# Patient Record
Sex: MALE | Race: OTHER | HISPANIC OR LATINO
[De-identification: names, ages, dates, MRNs, and addresses within clinical notes are randomized per-mention and may not be internally consistent; named-entity substitution may affect disease eponyms.]

---

## 2018-03-20 PROBLEM — Z00.00 ENCOUNTER FOR PREVENTIVE HEALTH EXAMINATION: Status: ACTIVE | Noted: 2018-03-20

## 2018-04-19 ENCOUNTER — APPOINTMENT (OUTPATIENT)
Dept: UROLOGY | Facility: CLINIC | Age: 43
End: 2018-04-19
Payer: MEDICAID

## 2018-04-19 ENCOUNTER — OUTPATIENT (OUTPATIENT)
Dept: OUTPATIENT SERVICES | Facility: HOSPITAL | Age: 43
LOS: 1 days | Discharge: HOME | End: 2018-04-19

## 2018-04-19 VITALS
RESPIRATION RATE: 16 BRPM | WEIGHT: 137 LBS | BODY MASS INDEX: 23.39 KG/M2 | HEART RATE: 74 BPM | DIASTOLIC BLOOD PRESSURE: 74 MMHG | SYSTOLIC BLOOD PRESSURE: 120 MMHG | HEIGHT: 64 IN

## 2018-04-19 DIAGNOSIS — Z78.9 OTHER SPECIFIED HEALTH STATUS: ICD-10-CM

## 2018-04-19 PROCEDURE — 99203 OFFICE O/P NEW LOW 30 MIN: CPT

## 2018-04-19 RX ORDER — SILDENAFIL 20 MG/1
20 TABLET ORAL
Qty: 20 | Refills: 5 | Status: ACTIVE | COMMUNITY
Start: 2018-04-19 | End: 1900-01-01

## 2018-05-16 ENCOUNTER — APPOINTMENT (OUTPATIENT)
Dept: UROLOGY | Facility: CLINIC | Age: 43
End: 2018-05-16
Payer: MEDICAID

## 2018-05-16 PROCEDURE — 93980 PENILE VASCULAR STUDY: CPT

## 2018-05-16 PROCEDURE — 54235 NJX CORPORA CAVERNOSA RX AGT: CPT

## 2018-06-21 ENCOUNTER — OUTPATIENT (OUTPATIENT)
Dept: OUTPATIENT SERVICES | Facility: HOSPITAL | Age: 43
LOS: 1 days | Discharge: HOME | End: 2018-06-21

## 2018-06-21 ENCOUNTER — APPOINTMENT (OUTPATIENT)
Dept: UROLOGY | Facility: CLINIC | Age: 43
End: 2018-06-21
Payer: MEDICAID

## 2018-06-21 VITALS
WEIGHT: 137 LBS | RESPIRATION RATE: 16 BRPM | HEART RATE: 59 BPM | DIASTOLIC BLOOD PRESSURE: 76 MMHG | HEIGHT: 64 IN | BODY MASS INDEX: 23.39 KG/M2 | SYSTOLIC BLOOD PRESSURE: 122 MMHG

## 2018-06-21 PROCEDURE — 99213 OFFICE O/P EST LOW 20 MIN: CPT

## 2018-08-06 ENCOUNTER — APPOINTMENT (OUTPATIENT)
Dept: UROLOGY | Facility: CLINIC | Age: 43
End: 2018-08-06
Payer: MEDICAID

## 2018-08-06 VITALS
SYSTOLIC BLOOD PRESSURE: 111 MMHG | DIASTOLIC BLOOD PRESSURE: 70 MMHG | BODY MASS INDEX: 23.39 KG/M2 | HEIGHT: 64 IN | HEART RATE: 69 BPM | WEIGHT: 137 LBS

## 2018-08-06 PROCEDURE — 54235 NJX CORPORA CAVERNOSA RX AGT: CPT

## 2018-08-06 PROCEDURE — 99212 OFFICE O/P EST SF 10 MIN: CPT | Mod: 25

## 2018-11-19 ENCOUNTER — APPOINTMENT (OUTPATIENT)
Dept: UROLOGY | Facility: CLINIC | Age: 43
End: 2018-11-19
Payer: MEDICAID

## 2018-11-19 VITALS
SYSTOLIC BLOOD PRESSURE: 113 MMHG | HEIGHT: 64 IN | BODY MASS INDEX: 23.39 KG/M2 | HEART RATE: 73 BPM | DIASTOLIC BLOOD PRESSURE: 71 MMHG | WEIGHT: 137 LBS

## 2018-11-19 PROCEDURE — 99213 OFFICE O/P EST LOW 20 MIN: CPT

## 2018-11-19 RX ORDER — LIDOCAINE HYDROCHLORIDE 30 MG/G
3 CREAM TOPICAL
Qty: 1 | Refills: 3 | Status: ACTIVE | COMMUNITY
Start: 2018-11-19 | End: 1900-01-01

## 2018-11-19 NOTE — ASSESSMENT
[FreeTextEntry1] : JAD OH is a 43 year old male with a past medical history of ED and premature ejaculation. Presents to the office today for a follow up, last seen on 8/6/2018. Patient currently on Trimix 0.4 ml and satisfied with results. Patent states he is  x 1 year and has been injecting Trimix approx 2 x a month. Has sexual drive. Patient states that erections are rigid and lasting 3 hours with feeling of bruised. Reviewed with patient instructions in [place]alese in detail on Trimix and that he should go to ER if he develops a painful erection lasting more than 3 hours as this could possibly lead to worsening erectile dysfunction.\par \par As per premature ejaculation, patient states that he is able to hold off for 5 minutes. \par

## 2018-11-19 NOTE — HISTORY OF PRESENT ILLNESS
[Currently Experiencing ___] :  [unfilled] [Erectile Dysfunction] : Erectile Dysfunction [None] : None [FreeTextEntry1] : JAD OH is a 43 year old male with a past medical history of ED and premature ejaculation. Presents to the office today for a follow up, last seen on 8/6/2018. Patient currently on Trimix 0.4 ml and satisfied with results. Patent states he is  x 1 year and has been injecting Trimix approx 2 x a month. Has sexual drive. Patient states that erections are rigid and lasting 3 hours with feeling of bruised. Reviewed with patient instructions in [place]ian in detail on Trimix and that he should go to ER if he develops a painful erection lasting more than 3 hours as this could possibly lead to worsening erectile dysfunction.\par \par As per premature ejaculation, patient states that he is able to hold off for 5 minutes. \par

## 2018-11-19 NOTE — LETTER BODY
[Please see my note below.] : Please see my note below. [Consult Closing:] : Thank you very much for allowing me to participate in the care of this patient.  If you have any questions, please do not hesitate to contact me. [Sincerely,] : Sincerely, [FreeTextEntry2] : D

## 2019-05-13 ENCOUNTER — APPOINTMENT (OUTPATIENT)
Dept: UROLOGY | Facility: CLINIC | Age: 44
End: 2019-05-13

## 2019-05-13 NOTE — HISTORY OF PRESENT ILLNESS
[FreeTextEntry1] : JAD OH is a 43 year old male with a past medical history of ED and premature ejaculation. Presents to the office today for a follow up, last seen on 8/6/2018. Patient currently on Trimix 0.4 ml and satisfied with results. Patent states he is  x 1 year and has been injecting Trimix approx 2 x a month. Has sexual drive. Patient states that erections are rigid and lasting 3 hours with feeling of bruised. Reviewed with patient instructions in [place]ian in detail on Trimix and that he should go to ER if he develops a painful erection lasting more than 3 hours as this could possibly lead to worsening erectile dysfunction.\par \par As per premature ejaculation, patient states that he is able to hold off for 5 minutes. \par

## 2019-10-02 ENCOUNTER — APPOINTMENT (OUTPATIENT)
Dept: UROLOGY | Facility: CLINIC | Age: 44
End: 2019-10-02

## 2020-11-09 ENCOUNTER — APPOINTMENT (OUTPATIENT)
Dept: UROLOGY | Facility: CLINIC | Age: 45
End: 2020-11-09
Payer: SELF-PAY

## 2020-11-09 VITALS — HEIGHT: 65 IN | TEMPERATURE: 98.2 F | BODY MASS INDEX: 22.49 KG/M2 | WEIGHT: 135 LBS

## 2020-11-09 DIAGNOSIS — F52.4 PREMATURE EJACULATION: ICD-10-CM

## 2020-11-09 DIAGNOSIS — N52.01 ERECTILE DYSFUNCTION DUE TO ARTERIAL INSUFFICIENCY: ICD-10-CM

## 2020-11-09 PROCEDURE — 99212 OFFICE O/P EST SF 10 MIN: CPT

## 2020-11-09 NOTE — HISTORY OF PRESENT ILLNESS
[FreeTextEntry1] : JAD OH is a 43 year old male with a past medical history of ED and premature ejaculation. Presents to the office today for a follow up, last seen on 8/6/2018. Patient currently on Trimix 0.4 ml and satisfied with results. Patent states he is  x 1 year and has been injecting Trimix approx 2 x a month. Has sexual drive. Patient states that erections are rigid and lasting 3 hours with feeling of bruised. Reviewed with patient instructions in [place] in detail on Trimix and that he should go to ER if he develops a painful erection lasting more than 3 hours as this could possibly lead to worsening erectile dysfunction.\par \par As per premature ejaculation, patient states that he is able to hold off for 5 minutes. \par has a rx for lidocaine cream\par \par states that has foreplay for 1 hour and loses erection as about to penetrate\par

## 2020-11-09 NOTE — PHYSICAL EXAM
[General Appearance - Well Developed] : well developed [General Appearance - Well Nourished] : well nourished [Normal Appearance] : normal appearance [Well Groomed] : well groomed [General Appearance - In No Acute Distress] : no acute distress [Abdomen Soft] : soft [Abdomen Tenderness] : non-tender [Costovertebral Angle Tenderness] : no ~M costovertebral angle tenderness [Urethral Meatus] : meatus normal [Scrotum] : the scrotum was normal [Testes Mass (___cm)] : there were no testicular masses [Edema] : no peripheral edema [] : no respiratory distress [Respiration, Rhythm And Depth] : normal respiratory rhythm and effort [Exaggerated Use Of Accessory Muscles For Inspiration] : no accessory muscle use [Oriented To Time, Place, And Person] : oriented to person, place, and time [Affect] : the affect was normal [Mood] : the mood was normal [Not Anxious] : not anxious [Normal Station and Gait] : the gait and station were normal for the patient's age [No Focal Deficits] : no focal deficits

## 2020-11-09 NOTE — ASSESSMENT
[FreeTextEntry1] : JAD OH is a 43 year old male with a past medical history of ED and premature ejaculation. Presents to the office today for a follow up, last seen on 8/6/2018. Patient currently on Trimix 0.4 ml and satisfied with results. Patent states he is  x 1 year and has been injecting Trimix approx 2 x a month. Has sexual drive. Patient states that erections are rigid and lasting 3 hours with feeling of bruised. Reviewed with patient instructions in Trinidadian in detail on Trimix and that he should go to ER if he develops a painful erection lasting more than 3 hours as this could possibly lead to worsening erectile dysfunction.\par \par As per premature ejaculation, patient states that he is able to hold off for 5 minutes. \par has a rx for lidocaine cream\par \par states that has foreplay for 1 hour and loses erection as about to penetrate\par

## 2020-11-30 ENCOUNTER — APPOINTMENT (OUTPATIENT)
Dept: UROLOGY | Facility: CLINIC | Age: 45
End: 2020-11-30

## 2021-02-08 ENCOUNTER — APPOINTMENT (OUTPATIENT)
Dept: UROLOGY | Facility: CLINIC | Age: 46
End: 2021-02-08

## 2022-02-23 ENCOUNTER — APPOINTMENT (OUTPATIENT)
Dept: UROLOGY | Facility: CLINIC | Age: 47
End: 2022-02-23

## 2023-03-22 ENCOUNTER — APPOINTMENT (OUTPATIENT)
Dept: UROLOGY | Facility: CLINIC | Age: 48
End: 2023-03-22